# Patient Record
Sex: FEMALE | Race: OTHER | NOT HISPANIC OR LATINO | Employment: OTHER | URBAN - METROPOLITAN AREA
[De-identification: names, ages, dates, MRNs, and addresses within clinical notes are randomized per-mention and may not be internally consistent; named-entity substitution may affect disease eponyms.]

---

## 2022-10-27 ENCOUNTER — TELEPHONE (OUTPATIENT)
Dept: URGENT CARE | Facility: CLINIC | Age: 72
End: 2022-10-27

## 2022-10-27 ENCOUNTER — APPOINTMENT (OUTPATIENT)
Dept: RADIOLOGY | Facility: CLINIC | Age: 72
End: 2022-10-27
Payer: MEDICARE

## 2022-10-27 ENCOUNTER — OFFICE VISIT (OUTPATIENT)
Dept: URGENT CARE | Facility: CLINIC | Age: 72
End: 2022-10-27
Payer: MEDICARE

## 2022-10-27 VITALS
TEMPERATURE: 97.6 F | SYSTOLIC BLOOD PRESSURE: 140 MMHG | RESPIRATION RATE: 16 BRPM | DIASTOLIC BLOOD PRESSURE: 68 MMHG | HEART RATE: 68 BPM | OXYGEN SATURATION: 100 %

## 2022-10-27 DIAGNOSIS — S89.91XA INJURY OF RIGHT KNEE, INITIAL ENCOUNTER: Primary | ICD-10-CM

## 2022-10-27 DIAGNOSIS — S89.91XA INJURY OF RIGHT KNEE, INITIAL ENCOUNTER: ICD-10-CM

## 2022-10-27 PROCEDURE — 73564 X-RAY EXAM KNEE 4 OR MORE: CPT

## 2022-10-27 PROCEDURE — 99203 OFFICE O/P NEW LOW 30 MIN: CPT

## 2022-10-27 PROCEDURE — G0463 HOSPITAL OUTPT CLINIC VISIT: HCPCS

## 2022-10-27 RX ORDER — LOSARTAN POTASSIUM 100 MG/1
TABLET ORAL
COMMUNITY

## 2022-10-27 RX ORDER — CLOPIDOGREL BISULFATE 75 MG/1
TABLET ORAL
COMMUNITY
Start: 2022-08-10

## 2022-10-27 RX ORDER — SIMETHICONE 125 MG
TABLET,CHEWABLE ORAL
COMMUNITY
Start: 2022-10-05

## 2022-10-27 RX ORDER — EZETIMIBE 10 MG/1
10 TABLET ORAL DAILY
COMMUNITY
Start: 2022-09-21

## 2022-10-27 RX ORDER — AMLODIPINE BESYLATE 2.5 MG/1
TABLET ORAL
COMMUNITY
Start: 2022-07-26

## 2022-10-27 RX ORDER — METOPROLOL SUCCINATE 50 MG/1
TABLET, EXTENDED RELEASE ORAL
COMMUNITY
Start: 2022-09-13

## 2022-10-27 RX ORDER — ASPIRIN 81 MG/1
81 TABLET, CHEWABLE ORAL
COMMUNITY

## 2022-10-27 RX ORDER — LEVOTHYROXINE SODIUM 0.1 MG/1
TABLET ORAL
COMMUNITY
Start: 2022-09-13

## 2022-10-27 RX ORDER — ATORVASTATIN CALCIUM 80 MG/1
TABLET, FILM COATED ORAL
COMMUNITY
Start: 2022-08-10

## 2022-10-27 NOTE — TELEPHONE ENCOUNTER
Called to discuss final knee XR results  Spoke with patient's daughter with permission of patient as she has difficulty understanding [place]  Explained the concern for a possible fracture at the corner of the lateral tibial plateau  Advised she continue to wear knee brace that was provided at today's visit  Weight bear as tolerated  Encouraged to call and schedule Orthopedic appointment tomorrow  Daughter verbalized understanding, and all questions were answered

## 2022-10-27 NOTE — PATIENT INSTRUCTIONS
1   Wear your brace 3-5 days  2  Tylenol 1,000 mg every 6 hours  Apply Voltaren gel as directed  3  F/u with Ortho Dr  In 3-7 days  4  Ice/elevation        Knee Pain   WHAT YOU NEED TO KNOW:   Knee pain may start suddenly, or it may be a long-term problem  You may have pain on the side, front, or back of your knee  You may have knee stiffness and swelling  You may hear popping sounds or feel like your knee is giving way or locking up as you walk  You may feel pain when you sit, stand, walk, or climb up and down stairs  Knee pain can be caused by conditions such as obesity, inflammation, or strains or tears in ligaments or tendons  DISCHARGE INSTRUCTIONS:   Return to the emergency department if:   Your pain is worse, even after treatment  You cannot bend or straighten your leg completely  The swelling around your knee does not go down even with treatment  Your knee is painful and hot to the touch  Contact your healthcare provider if:   You have questions or concerns about your condition or care  Medicines: You may need any of the following:  NSAIDs  help decrease swelling and pain or fever  This medicine is available with or without a doctor's order  NSAIDs can cause stomach bleeding or kidney problems in certain people  If you take blood thinner medicine, always ask your healthcare provider if NSAIDs are safe for you  Always read the medicine label and follow directions  Acetaminophen  decreases pain and fever  It is available without a doctor's order  Ask how much to take and how often to take it  Follow directions  Read the labels of all other medicines you are using to see if they also contain acetaminophen, or ask your doctor or pharmacist  Acetaminophen can cause liver damage if not taken correctly  Do not use more than 4 grams (4,000 milligrams) total of acetaminophen in one day  Prescription pain medicine  may be given   Ask your healthcare provider how to take this medicine safely  Some prescription pain medicines contain acetaminophen  Do not take other medicines that contain acetaminophen without talking to your healthcare provider  Too much acetaminophen may cause liver damage  Prescription pain medicine may cause constipation  Ask your healthcare provider how to prevent or treat constipation  Take your medicine as directed  Contact your healthcare provider if you think your medicine is not helping or if you have side effects  Tell him or her if you are allergic to any medicine  Keep a list of the medicines, vitamins, and herbs you take  Include the amounts, and when and why you take them  Bring the list or the pill bottles to follow-up visits  Carry your medicine list with you in case of an emergency  What you can do to manage your symptoms:   Rest your knee so it can heal   Limit activities that increase your pain  Do low-impact exercises, such as walking or swimming  Apply ice to help reduce swelling and pain  Use an ice pack, or put crushed ice in a plastic bag  Cover it with a towel before you apply it to your knee  Apply ice for 15 to 20 minutes every hour, or as directed  Apply compression to help reduce swelling  Use a brace or bandage only as directed  Elevate your knee to help decrease pain and swelling  Elevate your knee while you are sitting or lying down  Prop your leg on pillows to keep your knee above the level of your heart  Prevent your knee from moving as directed  Your healthcare provider may put on a cast or splint  You may need to wear a leg brace to stabilize your knee  A leg brace can be adjusted to increase your range of motion as your knee heals  What you can do to prevent knee pain:   Maintain a healthy weight  Extra weight increases your risk for knee pain  Ask your healthcare provider how much you should weigh  He or she can help you create a safe weight loss plan if you need to lose weight       Exercise or train properly  Use the correct equipment for sports  Wear shoes that provide good support  Check your posture often as you exercise, play sports, or train for an event  This can help prevent stress and strain on your knees  Rest between sessions so you do not overwork your knees  Follow up with your healthcare provider within 24 hours or as directed: You may need follow-up treatments, such as steroid injections to decrease pain  Write down your questions so you remember to ask them during your visits  © Copyright [employer] 2021 Information is for End User's use only and may not be sold, redistributed or otherwise used for commercial purposes  All illustrations and images included in CareNotes® are the copyrighted property of A D A M , Inc  or [facility] [employer]   The above information is an  only  It is not intended as medical advice for individual conditions or treatments  Talk to your doctor, nurse or pharmacist before following any medical regimen to see if it is safe and effective for you

## 2022-10-27 NOTE — PROGRESS NOTES
[employer]        NAME: Sandra Colby is a 73 y o  female  : 1950    MRN: [id number]  DATE: 2022  TIME: 1:03 PM    Assessment and Plan   Injury of right knee, initial encounter [S89 91XA]  1  Injury of right knee, initial encounter  XR knee 4+ vw right injury    Ambulatory referral to Orthopedic Surgery       XR knee appears negative for acute fracture or abnormality per my read  Await final radiology report  Will place in hinged knee brace and provide Ortho referral      Patient Instructions     Patient Instructions     1  Wear your brace 3-5 days  2  Tylenol 1,000 mg every 6 hours  Apply Voltaren gel as directed  3  F/u with Ortho Dr  In 3-7 days  4  Ice/elevation        Knee Pain   WHAT YOU NEED TO KNOW:   Knee pain may start suddenly, or it may be a long-term problem  You may have pain on the side, front, or back of your knee  You may have knee stiffness and swelling  You may hear popping sounds or feel like your knee is giving way or locking up as you walk  You may feel pain when you sit, stand, walk, or climb up and down stairs  Knee pain can be caused by conditions such as obesity, inflammation, or strains or tears in ligaments or tendons  DISCHARGE INSTRUCTIONS:   Return to the emergency department if:   · Your pain is worse, even after treatment  · You cannot bend or straighten your leg completely  · The swelling around your knee does not go down even with treatment  · Your knee is painful and hot to the touch  Contact your healthcare provider if:   · You have questions or concerns about your condition or care  Medicines: You may need any of the following:  · NSAIDs  help decrease swelling and pain or fever  This medicine is available with or without a doctor's order  NSAIDs can cause stomach bleeding or kidney problems in certain people  If you take blood thinner medicine, always ask your healthcare provider if NSAIDs are safe for you  Always read the medicine label and follow directions  · Acetaminophen  decreases pain and fever  It is available without a doctor's order  Ask how much to take and how often to take it  Follow directions  Read the labels of all other medicines you are using to see if they also contain acetaminophen, or ask your doctor or pharmacist  Acetaminophen can cause liver damage if not taken correctly  Do not use more than 4 grams (4,000 milligrams) total of acetaminophen in one day  · Prescription pain medicine  may be given  Ask your healthcare provider how to take this medicine safely  Some prescription pain medicines contain acetaminophen  Do not take other medicines that contain acetaminophen without talking to your healthcare provider  Too much acetaminophen may cause liver damage  Prescription pain medicine may cause constipation  Ask your healthcare provider how to prevent or treat constipation  · Take your medicine as directed  Contact your healthcare provider if you think your medicine is not helping or if you have side effects  Tell him or her if you are allergic to any medicine  Keep a list of the medicines, vitamins, and herbs you take  Include the amounts, and when and why you take them  Bring the list or the pill bottles to follow-up visits  Carry your medicine list with you in case of an emergency  What you can do to manage your symptoms:   · Rest your knee so it can heal   Limit activities that increase your pain  Do low-impact exercises, such as walking or swimming  · Apply ice to help reduce swelling and pain  Use an ice pack, or put crushed ice in a plastic bag  Cover it with a towel before you apply it to your knee  Apply ice for 15 to 20 minutes every hour, or as directed  · Apply compression to help reduce swelling  Use a brace or bandage only as directed  · Elevate your knee to help decrease pain and swelling  Elevate your knee while you are sitting or lying down  Prop your leg on pillows to keep your knee above the level of your heart  · Prevent your knee from moving as directed  Your healthcare provider may put on a cast or splint  You may need to wear a leg brace to stabilize your knee  A leg brace can be adjusted to increase your range of motion as your knee heals  What you can do to prevent knee pain:   · Maintain a healthy weight  Extra weight increases your risk for knee pain  Ask your healthcare provider how much you should weigh  He or she can help you create a safe weight loss plan if you need to lose weight  · Exercise or train properly  Use the correct equipment for sports  Wear shoes that provide good support  Check your posture often as you exercise, play sports, or train for an event  This can help prevent stress and strain on your knees  Rest between sessions so you do not overwork your knees  Follow up with your healthcare provider within 24 hours or as directed: You may need follow-up treatments, such as steroid injections to decrease pain  Write down your questions so you remember to ask them during your visits  © Copyright [employer] 2021 Information is for End User's use only and may not be sold, redistributed or otherwise used for commercial purposes  All illustrations and images included in CareNotes® are the copyrighted property of A [employer] A M , Inc  or [address] [place] [address]  The above information is an  only  It is not intended as medical advice for individual conditions or treatments  Talk to your doctor, nurse or pharmacist before following any medical regimen to see if it is safe and effective for you  Follow up with PCP in 3-5 days  Proceed to  ER if symptoms worsen  Chief Complaint     Chief Complaint   Patient presents with   • Knee Pain     Right knee pain after falling two days ago  History of Present Illness       Knee Pain   Incident onset: 2 days ago  The incident occurred at home   The injury mechanism was a fall (bumped into by grandson, fell from standing position onto right lateral knee  Denies hitting her head or orther injuries from fall)  The pain is present in the right knee  The pain is moderate  The pain has been constant since onset  Pertinent negatives include no inability to bear weight, loss of motion, loss of sensation, muscle weakness, numbness or tingling  She reports no foreign bodies present  The symptoms are aggravated by movement, palpation and weight bearing  She has tried NSAIDs for the symptoms  The treatment provided no relief  Review of Systems   Review of Systems   Constitutional: Negative  Respiratory: Negative  Cardiovascular: Negative  Musculoskeletal: Positive for arthralgias and joint swelling  Skin: Positive for color change  Negative for rash  Neurological: Negative for tingling and numbness           Current Medications       Current Outpatient Medications:   •  amLODIPine (NORVASC) 2 5 mg tablet, 1 TABLET(S) DAILY, Disp: , Rfl:   •  aspirin 81 mg chewable tablet, Chew 81 mg, Disp: , Rfl:   •  atorvastatin (LIPITOR) 80 mg tablet, 1 TABLET(S) DAILY ORAL, Disp: , Rfl:   •  clopidogrel (PLAVIX) 75 mg tablet, 1 TABLET(S) DAILY ORAL, Disp: , Rfl:   •  ezetimibe (ZETIA) 10 mg tablet, Take 10 mg by mouth daily, Disp: , Rfl:   •  levothyroxine 100 mcg tablet, 1 TABLET(S) DAILY, Disp: , Rfl:   •  losartan (COZAAR) 100 MG tablet, , Disp: , Rfl:   •  metoprolol succinate (TOPROL-XL) 50 mg 24 hr tablet, 1 TABLET(S) DAILY, Disp: , Rfl:   •  simethicone (MYLICON) 511 MG chewable tablet, 2 CAPSULE(S) DAILY ORAL, Disp: , Rfl:     Current Allergies     Allergies as of 10/27/2022   • (No Known Allergies)            The following portions of the patient's history were reviewed and updated as appropriate: allergies, current medications, past family history, past medical history, past social history, past surgical history and problem list      Past Medical History: Diagnosis Date   • Disease of thyroid gland    • Hypertension        History reviewed  No pertinent surgical history  History reviewed  No pertinent family history  Medications have been verified  Objective   /68   Pulse 68   Temp 97 6 °F (36 4 °C)   Resp 16   SpO2 100%        Physical Exam     Physical Exam  Vitals and nursing note reviewed  Constitutional:       General: She is not in acute distress  Appearance: Normal appearance  HENT:      Head: Normocephalic and atraumatic  Cardiovascular:      Rate and Rhythm: Normal rate and regular rhythm  Heart sounds: Normal heart sounds  Pulmonary:      Effort: Pulmonary effort is normal       Breath sounds: Normal breath sounds  Musculoskeletal:      Right knee: Swelling and ecchymosis present  No deformity or erythema  Normal range of motion  Tenderness present over the lateral joint line and PCL  No medial joint line, MCL or LCL tenderness  Normal patellar mobility  Legs:       Comments: Patient has good flexion and extension of knee  Pain present with flexion  Psychiatric:         Behavior: Behavior normal  Behavior is cooperative

## 2022-10-31 ENCOUNTER — APPOINTMENT (OUTPATIENT)
Dept: RADIOLOGY | Facility: CLINIC | Age: 72
End: 2022-10-31

## 2022-10-31 ENCOUNTER — OFFICE VISIT (OUTPATIENT)
Dept: OBGYN CLINIC | Facility: CLINIC | Age: 72
End: 2022-10-31

## 2022-10-31 VITALS — SYSTOLIC BLOOD PRESSURE: 149 MMHG | DIASTOLIC BLOOD PRESSURE: 75 MMHG | WEIGHT: 132 LBS | HEART RATE: 73 BPM
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
## 2022-10-31 DIAGNOSIS — S99.921A FOOT INJURY, RIGHT, INITIAL ENCOUNTER: ICD-10-CM

## 2022-10-31 DIAGNOSIS — S89.91XA INJURY OF RIGHT KNEE, INITIAL ENCOUNTER: ICD-10-CM

## 2022-10-31 DIAGNOSIS — S82.141A CLOSED FRACTURE OF RIGHT TIBIAL PLATEAU, INITIAL ENCOUNTER: ICD-10-CM

## 2022-10-31 DIAGNOSIS — S99.921A FOOT INJURY, RIGHT, INITIAL ENCOUNTER: Primary | ICD-10-CM

## 2022-10-31 DIAGNOSIS — M79.661 RIGHT CALF PAIN: ICD-10-CM

## 2022-10-31 NOTE — PROGRESS NOTES
[facility] [place] [id number]  [facility] [id number] [phone]      Chief Complaint:  Chief Complaint   Patient presents with   • Right Knee - Pain       Vitals:  /75   Pulse 73   Wt 59 9 kg (132 lb)     The following portions of the patient's history were reviewed and updated as appropriate: allergies, current medications, past family history, past medical history, past social history, past surgical history, and problem list       Subjective:   Patient ID: Nena Hale is a 73 y o  female  Here c/o R knee pain and R foot pain  Last week she tripped and landed on her R knee  Seen in   XR done  Given knee brace  Having a lot of pain  Hurts to walk   R foot pain started last night  Denies CP/SOB  Having R calf pain    RIGHT KNEE     INDICATION:   S89  91XA: Unspecified injury of right lower leg, initial encounter  Patient fell 2 days ago with bruising and swelling and pain      COMPARISON:  None     VIEWS:  XR KNEE 4+ VW RIGHT INJURY         FINDINGS:     On the internal oblique position image, there is suggestion of articular surface cortical disruption along the lateral margin of the lateral tibial plateau  Given the history of trauma, a fracture is suspected  Another potential possibility would just   be an unusual appearing bone spur at the margin of the tibial plateau  There is definitely degenerative arthritis in the knee although it seems much more apparent medially with joint narrowing and sclerosis and bone spur formation evident in that   region  There is also some arthritis of patellofemoral joint      Probable small joint effusion      There is atherosclerotic disease of the femoral and popliteal artery      No lytic or blastic osseous lesion      No foreign body      IMPRESSION:     Findings concerning for possible articular surface fracture at the corner of the lateral tibial plateau    Consider follow-up CT or MRI for further evaluation      Degenerative changes  Suspected small joint effusion          Review of Systems   Constitutional: Negative for fatigue and fever  Respiratory: Negative for shortness of breath  Cardiovascular: Negative for chest pain  Gastrointestinal: Negative for abdominal pain and nausea  Genitourinary: Negative for dysuria  Musculoskeletal: Positive for arthralgias and gait problem  Skin: Negative for rash and wound  Neurological: Negative for weakness and headaches  Objective:  Right Knee Exam     Tenderness   The patient is experiencing tenderness in the medial joint line  Range of Motion   Extension: normal   Flexion: abnormal     Other   Swelling: mild  Effusion: effusion present          Observations     Right Knee   Positive for effusion  Physical Exam  Vitals and nursing note reviewed  Constitutional:       Appearance: Normal appearance  She is well-developed  HENT:      Head: Normocephalic  Mouth/Throat:      Mouth: Mucous membranes are moist    Eyes:      Extraocular Movements: Extraocular movements intact  Cardiovascular:      Rate and Rhythm: Normal rate and regular rhythm  Heart sounds: Normal heart sounds  Pulmonary:      Effort: Pulmonary effort is normal       Breath sounds: Normal breath sounds  Abdominal:      General: Bowel sounds are normal       Palpations: Abdomen is soft  Musculoskeletal:         General: Tenderness present  Cervical back: Normal range of motion  Right knee: Effusion present  Comments: R calf TTP  R foot- 3-5 metatarsal TTP     Skin:     General: Skin is warm and dry  Neurological:      General: No focal deficit present  Mental Status: She is alert and oriented to person, place, and time  Psychiatric:         Mood and Affect: Mood normal          Behavior: Behavior normal          Thought Content:  Thought content normal          I have personally reviewed pertinent films in PACS and my interpretation is XR- R knee- possible lateral tibial plateau fx  R foot- old avulsion fx lateral to cuboid bone  Assessment/Plan:  Assessment/Plan   Diagnoses and all orders for this visit:    Foot injury, right, initial encounter  -     XR foot 3+ vw right; Future    Injury of right knee, initial encounter  -     Ambulatory referral to Orthopedic Surgery  -     Knee Immobilizer  -     MRI knee right  wo contrast; Future  -     VAS lower limb venous duplex study, unilateral/limited; Future  -     Wheelchair    Fall, initial encounter  -     XR foot 3+ vw right; Future  -     MRI knee right  wo contrast; Future  -     VAS lower limb venous duplex study, unilateral/limited; Future  -     Wheelchair    Right calf pain  -     VAS lower limb venous duplex study, unilateral/limited; Future    Closed fracture of right tibial plateau, initial encounter  -     Wheelchair        Return for f/u after MRI R knee, Recheck       Philipp Arciniega

## 2022-11-01 ENCOUNTER — HOSPITAL ENCOUNTER (OUTPATIENT)
Dept: NON INVASIVE DIAGNOSTICS | Facility: HOSPITAL | Age: 72
Discharge: HOME | End: 2022-11-01
Attending: FAMILY MEDICINE

## 2022-11-01 DIAGNOSIS — M79.661 RIGHT CALF PAIN: ICD-10-CM

## 2022-11-01 DIAGNOSIS — S89.91XA INJURY OF RIGHT KNEE, INITIAL ENCOUNTER: ICD-10-CM
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
## 2024-05-14 ENCOUNTER — OFFICE (OUTPATIENT)
Facility: LOCATION | Age: 74
Setting detail: OPHTHALMOLOGY
End: 2024-05-14
Payer: COMMERCIAL

## 2024-05-14 DIAGNOSIS — H40.013: ICD-10-CM

## 2024-05-14 DIAGNOSIS — H04.123: ICD-10-CM

## 2024-05-14 DIAGNOSIS — H17.823: ICD-10-CM

## 2024-05-14 PROCEDURE — 92133 CPTRZD OPH DX IMG PST SGM ON: CPT | Performed by: OPTOMETRIST

## 2024-05-14 PROCEDURE — 92025 CPTRIZED CORNEAL TOPOGRAPHY: CPT | Performed by: OPTOMETRIST

## 2024-05-14 PROCEDURE — 92004 COMPRE OPH EXAM NEW PT 1/>: CPT | Performed by: OPTOMETRIST

## 2024-05-14 ASSESSMENT — CONFRONTATIONAL VISUAL FIELD TEST (CVF)
OD_FINDINGS: FULL
OS_FINDINGS: FULL

## 2024-11-19 ENCOUNTER — OFFICE (OUTPATIENT)
Facility: LOCATION | Age: 74
Setting detail: OPHTHALMOLOGY
End: 2024-11-19
Payer: COMMERCIAL

## 2024-11-19 DIAGNOSIS — H04.123: ICD-10-CM

## 2024-11-19 DIAGNOSIS — H17.823: ICD-10-CM

## 2024-11-19 DIAGNOSIS — H40.013: ICD-10-CM

## 2024-11-19 DIAGNOSIS — H26.491: ICD-10-CM

## 2024-11-19 PROCEDURE — 92250 FUNDUS PHOTOGRAPHY W/I&R: CPT | Performed by: OPTOMETRIST

## 2024-11-19 PROCEDURE — 92083 EXTENDED VISUAL FIELD XM: CPT | Performed by: OPTOMETRIST

## 2024-11-19 PROCEDURE — 92012 INTRM OPH EXAM EST PATIENT: CPT | Performed by: OPTOMETRIST

## 2024-11-19 ASSESSMENT — KERATOMETRY
OS_K2POWER_DIOPTERS: 44.50
OD_K2POWER_DIOPTERS: 44.25
OD_AXISANGLE_DEGREES: 083
OD_K1POWER_DIOPTERS: 43.75
OS_AXISANGLE_DEGREES: 095
METHOD_AUTO_MANUAL: MANUAL
OS_K1POWER_DIOPTERS: 44.00

## 2024-11-19 ASSESSMENT — REFRACTION_MANIFEST
OS_AXIS: 028
OD_SPHERE: -0.50
OS_SPHERE: -0.25
OS_CYLINDER: -0.50
OD_VA1: 20/30-1
OD_AXIS: 171
OS_VA1: 20/30
OD_CYLINDER: -0.50

## 2024-11-19 ASSESSMENT — REFRACTION_AUTOREFRACTION
OD_CYLINDER: -0.50
OD_AXIS: 171
OS_CYLINDER: -0.50
OD_SPHERE: -0.50
OS_SPHERE: -0.25
OS_AXIS: 028

## 2024-11-19 ASSESSMENT — VISUAL ACUITY
OD_BCVA: 20/30+2
OS_BCVA: 20/25-2

## 2024-11-19 ASSESSMENT — CORNEAL SURGICAL SCARRING
OS_SCARRING: PERIPHERAL ANTERIOR STROMAL
OD_SCARRING: PERIPHERAL ANTERIOR STROMAL

## 2024-11-19 ASSESSMENT — TONOMETRY
OD_IOP_MMHG: 16
OS_IOP_MMHG: 12

## 2024-11-19 ASSESSMENT — CONFRONTATIONAL VISUAL FIELD TEST (CVF)
OS_FINDINGS: FULL
OD_FINDINGS: FULL

## 2025-05-20 ENCOUNTER — OFFICE (OUTPATIENT)
Facility: LOCATION | Age: 75
Setting detail: OPHTHALMOLOGY
End: 2025-05-20
Payer: COMMERCIAL

## 2025-05-20 DIAGNOSIS — H40.013: ICD-10-CM

## 2025-05-20 DIAGNOSIS — H17.823: ICD-10-CM

## 2025-05-20 DIAGNOSIS — H04.123: ICD-10-CM

## 2025-05-20 DIAGNOSIS — H43.813: ICD-10-CM

## 2025-05-20 DIAGNOSIS — H26.491: ICD-10-CM

## 2025-05-20 PROCEDURE — 92014 COMPRE OPH EXAM EST PT 1/>: CPT | Performed by: OPTOMETRIST

## 2025-05-20 PROCEDURE — 92202 OPSCPY EXTND ON/MAC DRAW: CPT | Performed by: OPTOMETRIST

## 2025-05-20 PROCEDURE — 92133 CPTRZD OPH DX IMG PST SGM ON: CPT | Performed by: OPTOMETRIST

## 2025-05-20 ASSESSMENT — REFRACTION_MANIFEST
OD_CYLINDER: -0.50
OS_SPHERE: -0.50
OS_AXIS: 026
OS_VA1: 20/30
OD_VA1: 20/25
OD_AXIS: 158
OD_SPHERE: -0.50
OS_CYLINDER: -0.50

## 2025-05-20 ASSESSMENT — REFRACTION_AUTOREFRACTION
OS_SPHERE: -0.50
OS_AXIS: 026
OD_SPHERE: -0.50
OD_CYLINDER: -0.50
OD_AXIS: 158
OS_CYLINDER: -0.50

## 2025-05-20 ASSESSMENT — CORNEAL SURGICAL SCARRING
OD_SCARRING: PERIPHERAL ANTERIOR STROMAL
OS_SCARRING: PERIPHERAL ANTERIOR STROMAL

## 2025-05-20 ASSESSMENT — CONFRONTATIONAL VISUAL FIELD TEST (CVF)
OS_FINDINGS: FULL
OD_FINDINGS: FULL

## 2025-05-20 ASSESSMENT — KERATOMETRY
OS_K1POWER_DIOPTERS: 43.75
OD_AXISANGLE_DEGREES: 077
METHOD_AUTO_MANUAL: AUTO
OD_K2POWER_DIOPTERS: 44.25
OS_K2POWER_DIOPTERS: 44.75
OD_K1POWER_DIOPTERS: 43.50
OS_AXISANGLE_DEGREES: 090

## 2025-05-20 ASSESSMENT — TONOMETRY
OD_IOP_MMHG: 17
OS_IOP_MMHG: 14

## 2025-05-20 ASSESSMENT — VISUAL ACUITY
OS_BCVA: 20/25-1
OD_BCVA: 20/30